# Patient Record
Sex: MALE
[De-identification: names, ages, dates, MRNs, and addresses within clinical notes are randomized per-mention and may not be internally consistent; named-entity substitution may affect disease eponyms.]

---

## 2021-10-23 ENCOUNTER — HOSPITAL ENCOUNTER (OUTPATIENT)
Dept: HOSPITAL 95 - LAB | Age: 61
Discharge: HOME | End: 2021-10-23
Attending: PHYSICIAN ASSISTANT
Payer: COMMERCIAL

## 2021-10-23 DIAGNOSIS — N62: Primary | ICD-10-CM

## 2022-01-17 ENCOUNTER — HOSPITAL ENCOUNTER (INPATIENT)
Dept: HOSPITAL 95 - ER | Age: 62
LOS: 3 days | Discharge: HOME HEALTH | DRG: 65 | End: 2022-01-20
Attending: HOSPITALIST | Admitting: HOSPITALIST
Payer: COMMERCIAL

## 2022-01-17 VITALS — BODY MASS INDEX: 26.32 KG/M2 | WEIGHT: 183.87 LBS | HEIGHT: 70 IN

## 2022-01-17 DIAGNOSIS — E87.6: ICD-10-CM

## 2022-01-17 DIAGNOSIS — R47.1: ICD-10-CM

## 2022-01-17 DIAGNOSIS — I63.89: Primary | ICD-10-CM

## 2022-01-17 DIAGNOSIS — F15.10: ICD-10-CM

## 2022-01-17 DIAGNOSIS — I16.1: ICD-10-CM

## 2022-01-17 DIAGNOSIS — Z71.6: ICD-10-CM

## 2022-01-17 DIAGNOSIS — Z91.14: ICD-10-CM

## 2022-01-17 DIAGNOSIS — R47.81: ICD-10-CM

## 2022-01-17 DIAGNOSIS — I66.9: ICD-10-CM

## 2022-01-17 DIAGNOSIS — F17.210: ICD-10-CM

## 2022-01-17 DIAGNOSIS — Z79.899: ICD-10-CM

## 2022-01-17 DIAGNOSIS — E78.5: ICD-10-CM

## 2022-01-17 DIAGNOSIS — G81.91: ICD-10-CM

## 2022-01-17 DIAGNOSIS — I10: ICD-10-CM

## 2022-01-17 DIAGNOSIS — I65.23: ICD-10-CM

## 2022-01-17 DIAGNOSIS — F12.90: ICD-10-CM

## 2022-01-17 DIAGNOSIS — R29.708: ICD-10-CM

## 2022-01-17 LAB
ALBUMIN SERPL BCP-MCNC: 4.1 G/DL (ref 3.4–5)
ALBUMIN/GLOB SERPL: 1.1 {RATIO} (ref 0.8–1.8)
ALT SERPL W P-5'-P-CCNC: 30 U/L (ref 12–78)
ANION GAP SERPL CALCULATED.4IONS-SCNC: 6 MMOL/L (ref 6–16)
AST SERPL W P-5'-P-CCNC: 15 U/L (ref 12–37)
BASOPHILS # BLD AUTO: 0.09 K/MM3 (ref 0–0.23)
BASOPHILS NFR BLD AUTO: 1 % (ref 0–2)
BILIRUB SERPL-MCNC: 0.6 MG/DL (ref 0.1–1)
BUN SERPL-MCNC: 22 MG/DL (ref 8–24)
CALCIUM SERPL-MCNC: 9.1 MG/DL (ref 8.5–10.1)
CHLORIDE SERPL-SCNC: 102 MMOL/L (ref 98–108)
CO2 SERPL-SCNC: 32 MMOL/L (ref 21–32)
CREAT SERPL-MCNC: 1.14 MG/DL (ref 0.6–1.2)
DEPRECATED RDW RBC AUTO: 42.5 FL (ref 35.1–46.3)
EOSINOPHIL # BLD AUTO: 0.22 K/MM3 (ref 0–0.68)
EOSINOPHIL NFR BLD AUTO: 3 % (ref 0–6)
ERYTHROCYTE [DISTWIDTH] IN BLOOD BY AUTOMATED COUNT: 13 % (ref 11.7–14.2)
ETHANOL SERPL-MCNC: <3 MG/DL
GLOBULIN SER CALC-MCNC: 3.7 G/DL (ref 2.2–4)
GLUCOSE SERPL-MCNC: 113 MG/DL (ref 70–99)
HCT VFR BLD AUTO: 49.5 % (ref 37–53)
HGB BLD-MCNC: 17.3 G/DL (ref 13.5–17.5)
IMM GRANULOCYTES # BLD AUTO: 0.03 K/MM3 (ref 0–0.1)
IMM GRANULOCYTES NFR BLD AUTO: 0 % (ref 0–1)
LYMPHOCYTES # BLD AUTO: 2.98 K/MM3 (ref 0.84–5.2)
LYMPHOCYTES NFR BLD AUTO: 35 % (ref 21–46)
MCHC RBC AUTO-ENTMCNC: 34.9 G/DL (ref 31.5–36.5)
MCV RBC AUTO: 88 FL (ref 80–100)
MONOCYTES # BLD AUTO: 0.64 K/MM3 (ref 0.16–1.47)
MONOCYTES NFR BLD AUTO: 8 % (ref 4–13)
NEUTROPHILS # BLD AUTO: 4.58 K/MM3 (ref 1.96–9.15)
NEUTROPHILS NFR BLD AUTO: 54 % (ref 41–73)
NRBC # BLD AUTO: 0 K/MM3 (ref 0–0.02)
NRBC BLD AUTO-RTO: 0 /100 WBC (ref 0–0.2)
PLATELET # BLD AUTO: 335 K/MM3 (ref 150–400)
POTASSIUM SERPL-SCNC: 3.5 MMOL/L (ref 3.5–5.5)
PROT SERPL-MCNC: 7.8 G/DL (ref 6.4–8.2)
SODIUM SERPL-SCNC: 140 MMOL/L (ref 136–145)

## 2022-01-17 PROCEDURE — G0378 HOSPITAL OBSERVATION PER HR: HCPCS

## 2022-01-17 PROCEDURE — C1751 CATH, INF, PER/CENT/MIDLINE: HCPCS

## 2022-01-17 PROCEDURE — G0480 DRUG TEST DEF 1-7 CLASSES: HCPCS

## 2022-01-17 PROCEDURE — A9270 NON-COVERED ITEM OR SERVICE: HCPCS

## 2022-01-18 LAB
ALBUMIN SERPL BCP-MCNC: 3.8 G/DL (ref 3.4–5)
ALBUMIN/GLOB SERPL: 1.2 {RATIO} (ref 0.8–1.8)
ALT SERPL W P-5'-P-CCNC: 33 U/L (ref 12–78)
AMPHETAMINES UR SCN-MCNC: DETECTED NG/ML
AMPHETAMINES UR-MCNC: DETECTED UG/L
ANION GAP SERPL CALCULATED.4IONS-SCNC: 8 MMOL/L (ref 6–16)
AST SERPL W P-5'-P-CCNC: 20 U/L (ref 12–37)
BASOPHILS # BLD AUTO: 0.07 K/MM3 (ref 0–0.23)
BASOPHILS NFR BLD AUTO: 1 % (ref 0–2)
BILIRUB SERPL-MCNC: 0.5 MG/DL (ref 0.1–1)
BUN SERPL-MCNC: 22 MG/DL (ref 8–24)
CALCIUM SERPL-MCNC: 8.8 MG/DL (ref 8.5–10.1)
CANNABINOIDS UR QL: DETECTED
CHLORIDE SERPL-SCNC: 103 MMOL/L (ref 98–108)
CO2 SERPL-SCNC: 28 MMOL/L (ref 21–32)
CREAT SERPL-MCNC: 0.93 MG/DL (ref 0.6–1.2)
DEPRECATED RDW RBC AUTO: 42.5 FL (ref 35.1–46.3)
EOSINOPHIL # BLD AUTO: 0.26 K/MM3 (ref 0–0.68)
EOSINOPHIL NFR BLD AUTO: 3 % (ref 0–6)
ERYTHROCYTE [DISTWIDTH] IN BLOOD BY AUTOMATED COUNT: 13.1 % (ref 11.7–14.2)
GLOBULIN SER CALC-MCNC: 3.2 G/DL (ref 2.2–4)
GLUCOSE SERPL-MCNC: 115 MG/DL (ref 70–99)
HCT VFR BLD AUTO: 45.5 % (ref 37–53)
HGB BLD-MCNC: 15.9 G/DL (ref 13.5–17.5)
IMM GRANULOCYTES # BLD AUTO: 0.03 K/MM3 (ref 0–0.1)
IMM GRANULOCYTES NFR BLD AUTO: 0 % (ref 0–1)
LYMPHOCYTES # BLD AUTO: 2.99 K/MM3 (ref 0.84–5.2)
LYMPHOCYTES NFR BLD AUTO: 32 % (ref 21–46)
MCHC RBC AUTO-ENTMCNC: 34.9 G/DL (ref 31.5–36.5)
MCV RBC AUTO: 88 FL (ref 80–100)
MONOCYTES # BLD AUTO: 0.63 K/MM3 (ref 0.16–1.47)
MONOCYTES NFR BLD AUTO: 7 % (ref 4–13)
NEUTROPHILS # BLD AUTO: 5.36 K/MM3 (ref 1.96–9.15)
NEUTROPHILS NFR BLD AUTO: 58 % (ref 41–73)
NRBC # BLD AUTO: 0 K/MM3 (ref 0–0.02)
NRBC BLD AUTO-RTO: 0 /100 WBC (ref 0–0.2)
PLATELET # BLD AUTO: 319 K/MM3 (ref 150–400)
POTASSIUM SERPL-SCNC: 3.2 MMOL/L (ref 3.5–5.5)
PROT SERPL-MCNC: 7 G/DL (ref 6.4–8.2)
RBC #/AREA URNS HPF: (no result) /HPF (ref 0–2)
SODIUM SERPL-SCNC: 139 MMOL/L (ref 136–145)
SP GR SPEC: 1.01 (ref 1–1.02)
UROBILINOGEN UR STRIP-MCNC: (no result) MG/DL
WBC #/AREA URNS HPF: (no result) /HPF (ref 0–5)

## 2022-01-18 NOTE — NUR
ASSUMED CARE OF PT AT 0105 FROM THE ED. A/OX4 WITH LABILE MOOD REGARDING THE
SITUATION AS HE FELT IT WAS VERY UNEXPECTED. CRYING EPISODES ARE WITNESSED,
THERAPEUTIC COMMUNICATION AND TOUCH PROVIDED. NO REPORTS OF PAIN,
CP/PRESSURE. EXPRESSIVE APHASIA AND SLURRING NOTED, THOUGH NOT CONSISTENT. NO
FACIAL DROOPING, ABLE TO SMILE/RAISE EYEBROWS, SHRUG SHOULDERS, MOVE TONGUE
ALL DIRECTIONS, NO ARM OR LEG WEAKNESS/DEVIATION NOTED. PERRLA. GAIT IS
UNSTEADY - BED IN LOW, BED ALARM, INSTRUCTIONS TO CALL.
BP REMAINS ELEVATED INTO 'S. DR. MANCIA NOTIFIED AND ANOTHER DOSE OF
HYDRALAZINE 10MG ORDERED. SBP DOWN 'S.
RESPIRATORY WNL, OVER 95% ON RA.  LS CLEAR. UNABLE TO OBTAIN TOXICOLOGY D/T
PATIENT REFUSING US TO TEST HIS URINE. EDUCATED PATIENT ON IMPORTANCE, BUT
PATIENT STILL REFUSES. PATIENT IS A 1/2 [employer] SMOKER, EDUCATED THAT WE CANNOT DO
THAT WHILE HE IS INPATIENT, AND ASKED IF PT WANTED NICOTINE PATCH AND HE
REFUSED.
WILL UPDATE AS CHANGES OCCUR.

## 2022-01-18 NOTE — NUR
Repeat BP did not meet prn medication parameters, <170 SBP and <100 DBP.
Will give PO BP meds at 1600 per Dr. Cortes.

## 2022-01-18 NOTE — NUR
SHift note:
Pt has right side weakness, drift in right upper ext, ataxia in right upper
ext as well as slur and mild difficulity finding words. BP has been elevated
all day. PO scheduled meds given with little effect as well as prn IV meds. Pt
is A&O, but has been sleeping between cares. Urine sample sent to lab, see
results. ECHO completed today. New IV placed due to infiltrated IV in left AC.
Pt is on RA.

## 2022-01-18 NOTE — NUR
Pt oriented x4. Pt is anxious and slightly agitated about having to do neuro
assessment, but does cooperate. Still has the right upper ext drift on
assessment and ataxia in right arm. Pt is still slurring and some word finding
trouble. BP is high, prn hydralazine given.

## 2022-01-18 NOTE — NUR
NIHSS DONE, SCORED 5 FOR RIGHT ARM DRIFT AND ATAXIA. SLURRED SPEECH, MILD WORD
FINING ISSUES AND SENSATION DIFFERENCE BETWEEN RIGHT AND LEFT SIDE. BP
ELEVATED 180-200 SYSTOLIC, WILL GIVE PRN IV HYDRALAZINE.

## 2022-01-18 NOTE — NUR
SBP >220 AND DBP >100, PRN HYDRALAZINE GIVEN PER ORDERS. BP STILL ELEVATED,
BUT NOW BELOW PARAMETERS, WILL MONITOR BP CLOSELY AND RECHECK IN ANOTHER 30
MINUTES. Pt is anxious and agited about having to have BP taken so often.

## 2022-01-18 NOTE — NUR
Pt /101, Dr. Cortes said to give norvasc and lisinopril early, both were
given and will recheck BP in one hour.

## 2022-01-19 LAB
ANION GAP SERPL CALCULATED.4IONS-SCNC: 6 MMOL/L (ref 6–16)
BUN SERPL-MCNC: 27 MG/DL (ref 8–24)
CALCIUM SERPL-MCNC: 9.2 MG/DL (ref 8.5–10.1)
CHLORIDE SERPL-SCNC: 101 MMOL/L (ref 98–108)
CO2 SERPL-SCNC: 30 MMOL/L (ref 21–32)
CREAT SERPL-MCNC: 1.1 MG/DL (ref 0.6–1.2)
GLUCOSE SERPL-MCNC: 112 MG/DL (ref 70–99)
POTASSIUM SERPL-SCNC: 3.5 MMOL/L (ref 3.5–5.5)
SODIUM SERPL-SCNC: 137 MMOL/L (ref 136–145)

## 2022-01-19 NOTE — NUR
I updated pt friend (roommate Shamar) with pt permission. Shamar said that pt is
normally a very active and outgoing individual.

## 2022-01-19 NOTE — NUR
Neuro assessment this AM was slighly differnt than yesterday. The weakness and
ataxia on the right side seems worse today. Pt right upper ext drifts, but
does not hit the bed on exam. The right lower ext appears weaker this AM as
well. Yesterday, pt did not have an issue with lifting leg off of bed and no
ataxia was noticed yesterday. Today pt is having a hard time lifting leg and
it drifts to bed when on assessment. Dr. Villarreal updated via phone on change in
neuro status. Pt is alert and oriented today, still has the slurring and
trouble with word finding. BP looks better this AM, scheduled meds given.

## 2022-01-19 NOTE — NUR
SHIFT SUMMARY
 
PATIENT FOUND TO A&OX4 AND WITHDRAWN UPON ASSUMPTION OF CARE. REAMINS
WITHDRAWN WITH A DEPRESSED AFFECT.NO NEURO CHECKS ORDERED BUT A QUICK BASIC
ONE SHOWED VERY LITTLE DEFICIT REMAINING WITH ONLY SLIGHT WEAKNESS ON RIGHT
SIDE NOW AS ONLY COMPLAINT. SPEECH IS CLEAR.  FRUSTRATED WITH SITUATION AND
REFUSES CARE AND ASSESSMENTS AT TIMES. BP STABLE AFTER DAYSHIFT RN GAVE PRN
CLONIDINE AND REMAINED SO OVERNIGHT. HR IN THE 70'S ON THE MONITOR. UP IND IN
ROOM.  TOELRATING REG DIET WITHOUT ISSUE. VOIDING WELL WITH BATHROOM
PRIVLEDGES AND PER URINAL. STATES HE IS READY TO GO HOME. NO ACUTE CONCERNS AT
THIS TIME. WILL CONTINUE PLAN OF CARE UNTIL REPORT GIVEN TO MALU LAWRENCE.

## 2022-01-19 NOTE — NUR
Pts girlfriend was wanting update. I got approval from pt to give update.
Callie was updated. Her phone number is [phone].

## 2022-01-19 NOTE — NUR
Shift note:
Pt is A&O, anxious and agitated at times. This AM the right sided weakness was
worse than yesterday, especially on the lower ext. Ataxia was also present
in both right ext.
MD notifed and STAT MRI ordered.
MRI did show an acute stroke, but pt is not a canadite for TPA. Pt
worked with pt and recomended a cane. SLP did swallow eval and put in order
for mechanical diet and thins ok. Up to chair and bathroom with cane and
supervision. BP better today, no prn needed just the scheduled medications
this AM.

## 2022-01-19 NOTE — NUR
Neuro check at 1100 was slighly improved. Pt was able to move lower extremity
some more, but his leg did fall to the bed before 5 seconds. Right upper ext
has drift still at this time and ataxia present in both upper and lower right
extremity. Pt was taken to MRI 1245.

## 2022-01-20 NOTE — NUR
PATIENT WAS DISCHARGED HOME DUE TO MULTIPLE REFUSALS OF CARE. ENCOURAGED THE
NEED FOR PARTICIPATION STILL DENIED OT AND PT. PATIENT IV AND POWERGLIDE WERE
TAKEN OUT AND TELEMETRY REMOVED. DISCHARGE INSTRUCTIONS WERE COMPLETELY
UNDERSTOOD. PATIENT HAD NO QUESTIONS COMMENTS OR CONCERNS, WHEELED OUT BY PCU
CNA.

## 2022-01-24 NOTE — NUR
Received referral from nurse care manager (Janine Mendez) on 1/20/2022.
 
Patient was admitted to [facility] on 1/19/2022 due to acute ischemic stroke.
 
Patient discharged 1/20/2022 with orders for home health and elected [facility].
 
Attempted to reach patient on 1/21/2022 at 1219. Unfortunately patient did not
answer. Left voicemail asking patient to return this writer's call. Second
attempted to reach patient on 1/24/2022 at 1222. Unfortunately patient did not
answer. Left voicemail asking patient to return this writer's call. Third and
final attempted to reach patient on 1/24/2022 at 1614. Unfortunately patient
did not answer. Left voicemail asking patient to return this writer's call.
 
As this writer is unable to reach/locate patient, referral was not sent to
[facility] for review.
 
No further interventions required.
 
Basilia Benavides
Referral Liaison